# Patient Record
Sex: FEMALE | Race: WHITE | NOT HISPANIC OR LATINO | Employment: FULL TIME | URBAN - METROPOLITAN AREA
[De-identification: names, ages, dates, MRNs, and addresses within clinical notes are randomized per-mention and may not be internally consistent; named-entity substitution may affect disease eponyms.]

---

## 2019-10-09 DIAGNOSIS — Z01.812 PRE-OPERATIVE LABORATORY EXAMINATION: ICD-10-CM

## 2019-10-09 LAB
BASOPHILS # BLD AUTO: 1.5 % (ref 0–1.8)
BASOPHILS # BLD: 0.15 K/UL (ref 0–0.12)
EOSINOPHIL # BLD AUTO: 0.18 K/UL (ref 0–0.51)
EOSINOPHIL NFR BLD: 1.8 % (ref 0–6.9)
ERYTHROCYTE [DISTWIDTH] IN BLOOD BY AUTOMATED COUNT: 41.8 FL (ref 35.9–50)
HCG SERPL QL: NEGATIVE
HCT VFR BLD AUTO: 39.8 % (ref 37–47)
HGB BLD-MCNC: 12.1 G/DL (ref 12–16)
IMM GRANULOCYTES # BLD AUTO: 0.02 K/UL (ref 0–0.11)
IMM GRANULOCYTES NFR BLD AUTO: 0.2 % (ref 0–0.9)
LYMPHOCYTES # BLD AUTO: 3.87 K/UL (ref 1–4.8)
LYMPHOCYTES NFR BLD: 38.1 % (ref 22–41)
MCH RBC QN AUTO: 25.8 PG (ref 27–33)
MCHC RBC AUTO-ENTMCNC: 30.4 G/DL (ref 33.6–35)
MCV RBC AUTO: 84.9 FL (ref 81.4–97.8)
MONOCYTES # BLD AUTO: 0.8 K/UL (ref 0–0.85)
MONOCYTES NFR BLD AUTO: 7.9 % (ref 0–13.4)
NEUTROPHILS # BLD AUTO: 5.15 K/UL (ref 2–7.15)
NEUTROPHILS NFR BLD: 50.5 % (ref 44–72)
NRBC # BLD AUTO: 0 K/UL
NRBC BLD-RTO: 0 /100 WBC
PLATELET # BLD AUTO: 286 K/UL (ref 164–446)
PMV BLD AUTO: 10.1 FL (ref 9–12.9)
RBC # BLD AUTO: 4.69 M/UL (ref 4.2–5.4)
WBC # BLD AUTO: 10.2 K/UL (ref 4.8–10.8)

## 2019-10-09 PROCEDURE — 84703 CHORIONIC GONADOTROPIN ASSAY: CPT

## 2019-10-09 PROCEDURE — 85025 COMPLETE CBC W/AUTO DIFF WBC: CPT

## 2019-10-09 PROCEDURE — 36415 COLL VENOUS BLD VENIPUNCTURE: CPT

## 2019-10-09 RX ORDER — TAMOXIFEN CITRATE 20 MG/1
20 TABLET ORAL EVERY MORNING
COMMUNITY

## 2019-10-09 RX ORDER — OMEPRAZOLE 40 MG/1
40 CAPSULE, DELAYED RELEASE ORAL EVERY MORNING
COMMUNITY

## 2019-10-09 RX ORDER — ASCORBIC ACID 500 MG
TABLET ORAL DAILY
COMMUNITY

## 2019-10-10 ENCOUNTER — ANESTHESIA (OUTPATIENT)
Dept: SURGERY | Facility: MEDICAL CENTER | Age: 44
End: 2019-10-10
Payer: COMMERCIAL

## 2019-10-10 ENCOUNTER — ANESTHESIA EVENT (OUTPATIENT)
Dept: SURGERY | Facility: MEDICAL CENTER | Age: 44
End: 2019-10-10
Payer: COMMERCIAL

## 2019-10-10 ENCOUNTER — HOSPITAL ENCOUNTER (OUTPATIENT)
Facility: MEDICAL CENTER | Age: 44
End: 2019-10-10
Attending: PLASTIC SURGERY | Admitting: PLASTIC SURGERY
Payer: COMMERCIAL

## 2019-10-10 VITALS
WEIGHT: 210.1 LBS | TEMPERATURE: 98.2 F | RESPIRATION RATE: 18 BRPM | DIASTOLIC BLOOD PRESSURE: 52 MMHG | HEIGHT: 65 IN | SYSTOLIC BLOOD PRESSURE: 99 MMHG | HEART RATE: 68 BPM | BODY MASS INDEX: 35 KG/M2 | OXYGEN SATURATION: 98 %

## 2019-10-10 DIAGNOSIS — G89.18 CHEST WALL PAIN FOLLOWING SURGERY: ICD-10-CM

## 2019-10-10 DIAGNOSIS — R07.89 CHEST WALL PAIN FOLLOWING SURGERY: ICD-10-CM

## 2019-10-10 PROCEDURE — 160029 HCHG SURGERY MINUTES - 1ST 30 MINS LEVEL 4: Performed by: PLASTIC SURGERY

## 2019-10-10 PROCEDURE — 500438 HCHG DRESSING, SPANDAGE #10 12: Performed by: PLASTIC SURGERY

## 2019-10-10 PROCEDURE — 160048 HCHG OR STATISTICAL LEVEL 1-5: Performed by: PLASTIC SURGERY

## 2019-10-10 PROCEDURE — 160041 HCHG SURGERY MINUTES - EA ADDL 1 MIN LEVEL 4: Performed by: PLASTIC SURGERY

## 2019-10-10 PROCEDURE — 160036 HCHG PACU - EA ADDL 30 MINS PHASE I: Performed by: PLASTIC SURGERY

## 2019-10-10 PROCEDURE — 700101 HCHG RX REV CODE 250: Performed by: PLASTIC SURGERY

## 2019-10-10 PROCEDURE — 160009 HCHG ANES TIME/MIN: Performed by: PLASTIC SURGERY

## 2019-10-10 PROCEDURE — 700111 HCHG RX REV CODE 636 W/ 250 OVERRIDE (IP): Performed by: ANESTHESIOLOGY

## 2019-10-10 PROCEDURE — 160025 RECOVERY II MINUTES (STATS): Performed by: PLASTIC SURGERY

## 2019-10-10 PROCEDURE — 501445 HCHG STAPLER, SKIN DISP: Performed by: PLASTIC SURGERY

## 2019-10-10 PROCEDURE — 501838 HCHG SUTURE GENERAL: Performed by: PLASTIC SURGERY

## 2019-10-10 PROCEDURE — 700111 HCHG RX REV CODE 636 W/ 250 OVERRIDE (IP): Performed by: PLASTIC SURGERY

## 2019-10-10 PROCEDURE — 160047 HCHG PACU  - EA ADDL 30 MINS PHASE II: Performed by: PLASTIC SURGERY

## 2019-10-10 PROCEDURE — 502000 HCHG MISC OR IMPLANTS RC 0278: Performed by: PLASTIC SURGERY

## 2019-10-10 PROCEDURE — 160046 HCHG PACU - 1ST 60 MINS PHASE II: Performed by: PLASTIC SURGERY

## 2019-10-10 PROCEDURE — A9270 NON-COVERED ITEM OR SERVICE: HCPCS | Performed by: ANESTHESIOLOGY

## 2019-10-10 PROCEDURE — 160002 HCHG RECOVERY MINUTES (STAT): Performed by: PLASTIC SURGERY

## 2019-10-10 PROCEDURE — 700101 HCHG RX REV CODE 250: Performed by: ANESTHESIOLOGY

## 2019-10-10 PROCEDURE — 700105 HCHG RX REV CODE 258: Performed by: PLASTIC SURGERY

## 2019-10-10 PROCEDURE — 700102 HCHG RX REV CODE 250 W/ 637 OVERRIDE(OP): Performed by: ANESTHESIOLOGY

## 2019-10-10 PROCEDURE — 160035 HCHG PACU - 1ST 60 MINS PHASE I: Performed by: PLASTIC SURGERY

## 2019-10-10 DEVICE — IMPLANTABLE DEVICE: Type: IMPLANTABLE DEVICE | Site: BREAST | Status: FUNCTIONAL

## 2019-10-10 RX ORDER — OXYCODONE HCL 5 MG/5 ML
10 SOLUTION, ORAL ORAL
Status: COMPLETED | OUTPATIENT
Start: 2019-10-10 | End: 2019-10-10

## 2019-10-10 RX ORDER — OXYCODONE HCL 5 MG/5 ML
5 SOLUTION, ORAL ORAL
Status: COMPLETED | OUTPATIENT
Start: 2019-10-10 | End: 2019-10-10

## 2019-10-10 RX ORDER — MEPERIDINE HYDROCHLORIDE 25 MG/ML
12.5 INJECTION INTRAMUSCULAR; INTRAVENOUS; SUBCUTANEOUS
Status: DISCONTINUED | OUTPATIENT
Start: 2019-10-10 | End: 2019-10-10 | Stop reason: HOSPADM

## 2019-10-10 RX ORDER — HYDROMORPHONE HYDROCHLORIDE 1 MG/ML
0.4 INJECTION, SOLUTION INTRAMUSCULAR; INTRAVENOUS; SUBCUTANEOUS
Status: DISCONTINUED | OUTPATIENT
Start: 2019-10-10 | End: 2019-10-10 | Stop reason: HOSPADM

## 2019-10-10 RX ORDER — MIDAZOLAM HYDROCHLORIDE 1 MG/ML
INJECTION INTRAMUSCULAR; INTRAVENOUS PRN
Status: DISCONTINUED | OUTPATIENT
Start: 2019-10-10 | End: 2019-10-10 | Stop reason: SURG

## 2019-10-10 RX ORDER — HYDROCODONE BITARTRATE AND ACETAMINOPHEN 5; 325 MG/1; MG/1
1-2 TABLET ORAL EVERY 4 HOURS PRN
Qty: 20 TAB | Refills: 0 | Status: SHIPPED | OUTPATIENT
Start: 2019-10-10 | End: 2019-10-25

## 2019-10-10 RX ORDER — LIDOCAINE HYDROCHLORIDE 10 MG/ML
INJECTION, SOLUTION INFILTRATION; PERINEURAL
Status: DISCONTINUED
Start: 2019-10-10 | End: 2019-10-10 | Stop reason: HOSPADM

## 2019-10-10 RX ORDER — SCOLOPAMINE TRANSDERMAL SYSTEM 1 MG/1
1 PATCH, EXTENDED RELEASE TRANSDERMAL
Status: DISCONTINUED | OUTPATIENT
Start: 2019-10-10 | End: 2019-10-10 | Stop reason: HOSPADM

## 2019-10-10 RX ORDER — DIPHENHYDRAMINE HYDROCHLORIDE 50 MG/ML
6.25 INJECTION INTRAMUSCULAR; INTRAVENOUS
Status: DISCONTINUED | OUTPATIENT
Start: 2019-10-10 | End: 2019-10-10 | Stop reason: HOSPADM

## 2019-10-10 RX ORDER — ONDANSETRON 2 MG/ML
4 INJECTION INTRAMUSCULAR; INTRAVENOUS
Status: DISCONTINUED | OUTPATIENT
Start: 2019-10-10 | End: 2019-10-10 | Stop reason: HOSPADM

## 2019-10-10 RX ORDER — EPINEPHRINE 1 MG/ML(1)
AMPUL (ML) INJECTION
Status: DISCONTINUED
Start: 2019-10-10 | End: 2019-10-10 | Stop reason: HOSPADM

## 2019-10-10 RX ORDER — ONDANSETRON 2 MG/ML
INJECTION INTRAMUSCULAR; INTRAVENOUS PRN
Status: DISCONTINUED | OUTPATIENT
Start: 2019-10-10 | End: 2019-10-10 | Stop reason: HOSPADM

## 2019-10-10 RX ORDER — HYDROMORPHONE HYDROCHLORIDE 1 MG/ML
0.2 INJECTION, SOLUTION INTRAMUSCULAR; INTRAVENOUS; SUBCUTANEOUS
Status: DISCONTINUED | OUTPATIENT
Start: 2019-10-10 | End: 2019-10-10 | Stop reason: HOSPADM

## 2019-10-10 RX ORDER — HYDROMORPHONE HYDROCHLORIDE 2 MG/ML
INJECTION, SOLUTION INTRAMUSCULAR; INTRAVENOUS; SUBCUTANEOUS PRN
Status: DISCONTINUED | OUTPATIENT
Start: 2019-10-10 | End: 2019-10-10 | Stop reason: SURG

## 2019-10-10 RX ORDER — DEXAMETHASONE SODIUM PHOSPHATE 4 MG/ML
INJECTION, SOLUTION INTRA-ARTICULAR; INTRALESIONAL; INTRAMUSCULAR; INTRAVENOUS; SOFT TISSUE PRN
Status: DISCONTINUED | OUTPATIENT
Start: 2019-10-10 | End: 2019-10-10 | Stop reason: SURG

## 2019-10-10 RX ORDER — HALOPERIDOL 5 MG/ML
1 INJECTION INTRAMUSCULAR
Status: DISCONTINUED | OUTPATIENT
Start: 2019-10-10 | End: 2019-10-10 | Stop reason: HOSPADM

## 2019-10-10 RX ORDER — ACETAMINOPHEN 500 MG
1000 TABLET ORAL ONCE
Status: COMPLETED | OUTPATIENT
Start: 2019-10-10 | End: 2019-10-10

## 2019-10-10 RX ORDER — GABAPENTIN 300 MG/1
300 CAPSULE ORAL
Status: COMPLETED | OUTPATIENT
Start: 2019-10-10 | End: 2019-10-10

## 2019-10-10 RX ORDER — HYDROMORPHONE HYDROCHLORIDE 1 MG/ML
0.1 INJECTION, SOLUTION INTRAMUSCULAR; INTRAVENOUS; SUBCUTANEOUS
Status: DISCONTINUED | OUTPATIENT
Start: 2019-10-10 | End: 2019-10-10 | Stop reason: HOSPADM

## 2019-10-10 RX ORDER — SODIUM CHLORIDE, SODIUM LACTATE, POTASSIUM CHLORIDE, CALCIUM CHLORIDE 600; 310; 30; 20 MG/100ML; MG/100ML; MG/100ML; MG/100ML
INJECTION, SOLUTION INTRAVENOUS CONTINUOUS
Status: DISCONTINUED | OUTPATIENT
Start: 2019-10-10 | End: 2019-10-10 | Stop reason: HOSPADM

## 2019-10-10 RX ORDER — BACITRACIN 50000 [IU]/1
INJECTION, POWDER, FOR SOLUTION INTRAMUSCULAR
Status: DISCONTINUED
Start: 2019-10-10 | End: 2019-10-10 | Stop reason: HOSPADM

## 2019-10-10 RX ORDER — BUPIVACAINE HYDROCHLORIDE AND EPINEPHRINE 5; 5 MG/ML; UG/ML
INJECTION, SOLUTION EPIDURAL; INTRACAUDAL; PERINEURAL
Status: DISCONTINUED
Start: 2019-10-10 | End: 2019-10-10 | Stop reason: HOSPADM

## 2019-10-10 RX ORDER — CEFAZOLIN SODIUM 1 G/3ML
INJECTION, POWDER, FOR SOLUTION INTRAMUSCULAR; INTRAVENOUS
Status: DISCONTINUED
Start: 2019-10-10 | End: 2019-10-10 | Stop reason: HOSPADM

## 2019-10-10 RX ORDER — LIDOCAINE HYDROCHLORIDE 10 MG/ML
INJECTION, SOLUTION EPIDURAL; INFILTRATION; INTRACAUDAL; PERINEURAL
Status: DISCONTINUED
Start: 2019-10-10 | End: 2019-10-10 | Stop reason: HOSPADM

## 2019-10-10 RX ORDER — ONDANSETRON 4 MG/1
4 TABLET, FILM COATED ORAL EVERY 4 HOURS PRN
Qty: 10 TAB | Refills: 1 | Status: SHIPPED | OUTPATIENT
Start: 2019-10-10

## 2019-10-10 RX ORDER — GENTAMICIN SULFATE 40 MG/ML
INJECTION, SOLUTION INTRAMUSCULAR; INTRAVENOUS
Status: DISCONTINUED
Start: 2019-10-10 | End: 2019-10-10 | Stop reason: HOSPADM

## 2019-10-10 RX ORDER — LIDOCAINE HYDROCHLORIDE 20 MG/ML
INJECTION, SOLUTION EPIDURAL; INFILTRATION; INTRACAUDAL; PERINEURAL PRN
Status: DISCONTINUED | OUTPATIENT
Start: 2019-10-10 | End: 2019-10-10 | Stop reason: SURG

## 2019-10-10 RX ORDER — CEFAZOLIN SODIUM 1 G/3ML
INJECTION, POWDER, FOR SOLUTION INTRAMUSCULAR; INTRAVENOUS PRN
Status: DISCONTINUED | OUTPATIENT
Start: 2019-10-10 | End: 2019-10-10 | Stop reason: SURG

## 2019-10-10 RX ORDER — CEPHALEXIN 500 MG/1
500 CAPSULE ORAL 4 TIMES DAILY
Qty: 28 CAP | Refills: 0 | Status: SHIPPED | OUTPATIENT
Start: 2019-10-10

## 2019-10-10 RX ORDER — HYDRALAZINE HYDROCHLORIDE 20 MG/ML
5 INJECTION INTRAMUSCULAR; INTRAVENOUS
Status: DISCONTINUED | OUTPATIENT
Start: 2019-10-10 | End: 2019-10-10 | Stop reason: HOSPADM

## 2019-10-10 RX ADMIN — DEXAMETHASONE SODIUM PHOSPHATE 8 MG: 4 INJECTION, SOLUTION INTRA-ARTICULAR; INTRALESIONAL; INTRAMUSCULAR; INTRAVENOUS; SOFT TISSUE at 09:58

## 2019-10-10 RX ADMIN — ACETAMINOPHEN 1000 MG: 500 TABLET ORAL at 09:13

## 2019-10-10 RX ADMIN — FENTANYL CITRATE 50 MCG: 50 INJECTION, SOLUTION INTRAMUSCULAR; INTRAVENOUS at 09:54

## 2019-10-10 RX ADMIN — GABAPENTIN 300 MG: 300 CAPSULE ORAL at 09:13

## 2019-10-10 RX ADMIN — ONDANSETRON 4 MG: 2 INJECTION INTRAMUSCULAR; INTRAVENOUS at 10:52

## 2019-10-10 RX ADMIN — EPHEDRINE SULFATE 10 MG: 50 INJECTION, SOLUTION INTRAVENOUS at 10:21

## 2019-10-10 RX ADMIN — FENTANYL CITRATE 50 MCG: 50 INJECTION, SOLUTION INTRAMUSCULAR; INTRAVENOUS at 10:08

## 2019-10-10 RX ADMIN — LIDOCAINE HYDROCHLORIDE 60 MG: 20 INJECTION, SOLUTION EPIDURAL; INFILTRATION; INTRACAUDAL at 09:54

## 2019-10-10 RX ADMIN — SODIUM CHLORIDE, POTASSIUM CHLORIDE, SODIUM LACTATE AND CALCIUM CHLORIDE: 600; 310; 30; 20 INJECTION, SOLUTION INTRAVENOUS at 09:12

## 2019-10-10 RX ADMIN — OXYCODONE HYDROCHLORIDE 10 MG: 5 SOLUTION ORAL at 11:54

## 2019-10-10 RX ADMIN — FENTANYL CITRATE 50 MCG: 50 INJECTION INTRAMUSCULAR; INTRAVENOUS at 13:01

## 2019-10-10 RX ADMIN — PROPOFOL 75 MCG/KG/MIN: 10 INJECTION, EMULSION INTRAVENOUS at 09:55

## 2019-10-10 RX ADMIN — FENTANYL CITRATE 50 MCG: 50 INJECTION, SOLUTION INTRAMUSCULAR; INTRAVENOUS at 10:27

## 2019-10-10 RX ADMIN — PROPOFOL 200 MG: 10 INJECTION, EMULSION INTRAVENOUS at 09:54

## 2019-10-10 RX ADMIN — CEFAZOLIN 2 G: 330 INJECTION, POWDER, FOR SOLUTION INTRAMUSCULAR; INTRAVENOUS at 09:54

## 2019-10-10 RX ADMIN — FENTANYL CITRATE 50 MCG: 50 INJECTION INTRAMUSCULAR; INTRAVENOUS at 12:47

## 2019-10-10 RX ADMIN — MIDAZOLAM 2 MG: 1 INJECTION INTRAMUSCULAR; INTRAVENOUS at 09:51

## 2019-10-10 RX ADMIN — FENTANYL CITRATE 50 MCG: 50 INJECTION, SOLUTION INTRAMUSCULAR; INTRAVENOUS at 10:36

## 2019-10-10 RX ADMIN — HYDROMORPHONE HYDROCHLORIDE 0.5 MG: 2 INJECTION, SOLUTION INTRAMUSCULAR; INTRAVENOUS; SUBCUTANEOUS at 10:49

## 2019-10-10 ASSESSMENT — PAIN SCALES - GENERAL: PAIN_LEVEL: 4

## 2019-10-10 NOTE — ANESTHESIA PROCEDURE NOTES
Airway  Date/Time: 10/10/2019 9:54 AM  Performed by: Reuben Rodriguez M.D.  Authorized by: Reuben Rodriguez M.D.     Location:  OR  Urgency:  Elective  Difficult Airway: No    Indications for Airway Management:  Anesthesia  Spontaneous Ventilation: absent    Sedation Level:  Deep  Preoxygenated: Yes    Mask Difficulty Assessment:  1 - vent by mask  Final Airway Type:  Supraglottic airway  Final Supraglottic Airway:  Standard LMA  SGA Size:  3  Number of Attempts at Approach:  1

## 2019-10-10 NOTE — ANESTHESIA PREPROCEDURE EVALUATION
Past Medical History:   Diagnosis Date   • Anemia    • Anesthesia     post op n/v   • Anxiety    • Arthritis     spondylosis arthritis in back   • Bowel habit changes     constipation   • Cancer (HCC) 2019    right breast   • GERD (gastroesophageal reflux disease)    • History of chronic cough    • Hypertension    • Pain 10/09/2019    chronic back, 1-2/10   • Snoring     no sleep study         Relevant Problems   No relevant active problems       Physical Exam    Airway   Mallampati: II  TM distance: >3 FB  Neck ROM: full       Cardiovascular - normal exam  Rhythm: regular  Rate: normal  (-) murmur     Dental - normal exam         Pulmonary - normal exam  Breath sounds clear to auscultation     Abdominal    Neurological - normal exam                 Anesthesia Plan    ASA 2       Plan - general       Airway plan will be LMA        Induction: intravenous    Postoperative Plan: Postoperative administration of opioids is intended.    Pertinent diagnostic labs and testing reviewed    Informed Consent:    Anesthetic plan and risks discussed with patient.

## 2019-10-10 NOTE — ANESTHESIA POSTPROCEDURE EVALUATION
Patient: Brenna Zhong    Procedure Summary     Date:  10/10/19 Room / Location:  [facility] ROOM 28 / SURGERY SAME DAY [facility]    Anesthesia Start:  0951 Anesthesia Stop:  1107    Procedures:       RECONSTRUCTION, BREAST-REVISION WITH DERMAL GLANDULAR FLAPS AND FAT GRAFTING (Bilateral Breast)      TISSUE EXPANDER- FOR REMOVAL (Bilateral Breast)      INSERTION, IMPLANT, BREAST (Bilateral Breast)      CAPSULOTOMY- FOR PARTIAL CAPSULECTOMY (Bilateral Breast) Diagnosis:  (MALIGNANT NEOPLASM OF OVERLAPPING SITES OF RIGHT BREAST)    Surgeon:  Leonard Alva M.D. Responsible Provider:  Reuben Rodriguez M.D.    Anesthesia Type:  general ASA Status:  2          Final Anesthesia Type: general  Last vitals  BP   Blood Pressure: 112/62    Temp   36.8 °C (98.2 °F)    Pulse   Pulse: (!) 110   Resp   18    SpO2   100 %      Anesthesia Post Evaluation    Patient location during evaluation: PACU  Patient participation: complete - patient participated  Level of consciousness: sleepy but conscious  Pain score: 4    Airway patency: patent  Anesthetic complications: no  Cardiovascular status: hemodynamically stable  Respiratory status: acceptable  Hydration status: euvolemic    PONV: none

## 2019-10-10 NOTE — OR NURSING
1111- assumed care of pt she is resting comfortable at this time . States pain is minimal at this time doesn't want to take pain medicine yet   1154- states pain level is starting to increase medicated with oral pain meds   1220- spouse to bedside   1247 po pain meds not helping yet medicated iv fent see mar  1327 pain currently at 2/10 without nausea qualifies for stage 2 transferred   1420 up to bathroom able to void dressed in bathroom and placed into a recliner chair   1500 states ready to dc to home meets all dc criteria iv removed escorted out to car with all belongings accomp with spouse

## 2019-10-10 NOTE — OP REPORT
DATE OF SERVICE:  10/10/2019    PREOPERATIVE DIAGNOSIS:  History of breast cancer.    POSTOPERATIVE DIAGNOSIS:  History of breast cancer.    PROCEDURES:  1.  Bilateral tissue expander removal and gel implant placement.  2.  Bilateral inferiorly based dermal glandular flaps for elevation and   reconstruction of inframammary fold with revision of breast reconstruction.  3.  Bilateral breast reconstruction with fat grafting.    ATTENDING SURGEON:  Leonard Alva MD    ANESTHESIOLOGIST:  Reuben Rodriguez MD    ASSISTANT:  CYNDEE Jameson    ANESTHESIA TYPE:  General.    SPECIMENS:  None.    ESTIMATED BLOOD LOSS:  Minimal.    COMPLICATIONS:  Non-apparent.    SPECIMENS:  None.    INDICATIONS FOR PROCEDURE:  The patient is a 43-year-old woman who underwent   bilateral mastectomies and reconstruction with placement of tissue expanders.    The patient is obese and has had completed expansion.  She now presents for   the above operation.    INTRAOPERATIVE FINDINGS:  There was approximately 80 mL of fat grafted in each   of the reconstructed breast.  The implants that were used were Allergan style   SCF Natrelle Inspira cohesive breast implant 770 mL, reference number   [id number].  Same size implant on both sides.    DESCRIPTION OF PROCEDURE:  After the operative and nonoperative options were   discussed and include was not limited to bleeding, infection, damage to   surrounding structures, need for further surgery, reaction to anesthetic   agent, scarring, breast asymmetry, contour irregularity, wound healing   difficulties, need for revisional surgery, implant failure, implant rupture,   capsular contracture development, wound healing difficulties, deep venous   thrombosis, pulmonary embolus, myocardial infarction, stroke, unsatisfactory   result and/or death, informed consent was obtained.    Patient was identified.  In a sitting upright position, the patient's sternal   notch midline inframammary folds were marked.   The planned dermal glandular   flaps were marked along the inframammary fold.  Areas for fat graft harvest   were marked in the infraumbilical abdomen.  Antibiotics given, sequential   compression devices placed.  Patient was brought to the OR where general   anesthesia was induced.  Her chest and abdomen were then prepped and draped in   the usual sterile fashion.  Starting on the right hand side, an incision was   made through the old mastectomy scar.  Cautery was used to dissect down to the   underlying tissue down to the underlying capsule, which was opened up.    Tissue expander was then deflated and removed.  At this point, capsulotomies   were performed medially and superiorly in order to open up the pocket.    Capsulorrhaphies were performed laterally with horizontal mattress 2-0 Vicryl   sutures.  The pocket was then copiously irrigated with triple antibiotic   irrigation.  The patient was then sat up just slightly.  Given her obese   abdomen, this was fairly limited.  I felt that the 800 mL implant would be   most appropriate.  With the sizer in position, the dermal glandular flaps were   then incised with a #10 blade along the inframammary fold.  Cautery was used   to elevate and mobilize and create advancement flaps.  A portion of the tissue   was then deepithelialized with a #10 blade with Bovie electrocautery.  The   flaps were then elevated in a superior to inferior direction.  This was done   to create a crisp well defined in the inframammary fold.  The standing   cutaneous deformities were excised medially and laterally.  Following this   then, the sizer was removed.  The pocket was then copiously irrigated with   triple antibiotic irrigation and Betadine.  Then, using new gloves and minimal   touch technique, the implant was placed.  The deep tissue was then closed   with horizontal mattress 2-0 Vicryl sutures.  All cutaneous incisions were   then closed with 3-0 deep dermal Monocryl sutures and a  running 4-0 Monocryl   subcuticular stitch to close the overlying skin.    We then turned our attention to the left side where the same procedure was   performed and the same size implant 800 mL was placed.    The poke incisions were made in the bilateral lower quadrant.  Tumescent   solution was then placed in these locations.  Adequate time was allowed for   the hemostatic effects of epinephrine to take effect.  Fat was then harvested   from the supra and infraumbilical abdomen using [employer] fat harvesting system.    Fat was then irrigated 3 times per protocol and then loaded in 20 mL   syringes.  Poke incisions were made along the superior medial aspect of the   breast.  Fat was then sequentially grafted along the superior medial and   superior lateral poles and along the medial aspect of the breast.  A fanning   technique was used.  Care was taken to ensure equal amounts of fat were placed   with each passage of the cannula.  The fat was then further molded into   position.  The poke incisions both on the breast and abdomen were then closed   with interrupted 5-0 fast absorbing sutures.  The patient was then washed.    Steri-Strips and compressive dressing was then placed.  The patient was then   awakened, extubated, and transferred to the PACU in stable condition.  At the   end of procedure, all sponge, instrument and needle counts were correct.       ____________________________________     MD KELLIE BUSH / CASSANDRA    DD:  10/10/2019 11:04:40  DT:  10/10/2019 13:42:24    D#:  [id number]  Job#:  [id number]

## 2019-10-10 NOTE — DISCHARGE INSTRUCTIONS
ACTIVITY: Rest and take it easy for the first 24 hours.  A responsible adult is recommended to remain with you during that time.  It is normal to feel sleepy.  We encourage you to not do anything that requires balance, judgment or coordination.    MILD FLU-LIKE SYMPTOMS ARE NORMAL. YOU MAY EXPERIENCE GENERALIZED MUSCLE ACHES, THROAT IRRITATION, HEADACHE AND/OR SOME NAUSEA.    FOR 24 HOURS DO NOT:  Drive, operate machinery or run household appliances.  Drink beer or alcoholic beverages.   Make important decisions or sign legal documents.    SPECIAL INSTRUCTIONS: *Keep dressing dry and clean for 3 days then okay to shower. Cool compresses as needed.  **    DIET: To avoid nausea, slowly advance diet as tolerated, avoiding spicy or greasy foods for the first day.  Add more substantial food to your diet according to your physician's instructions.  Babies can be fed formula or breast milk as soon as they are hungry.  INCREASE FLUIDS AND FIBER TO AVOID CONSTIPATION.    SURGICAL DRESSING/BATHING: *see above**    FOLLOW-UP APPOINTMENT:  A follow-up appointment should be arranged with your doctor  call to schedule Follow up as already scheduled     You should CALL YOUR PHYSICIAN if you develop:  Fever greater than 101 degrees F.  Pain not relieved by medication, or persistent nausea or vomiting.  Excessive bleeding (blood soaking through dressing) or unexpected drainage from the wound.  Extreme redness or swelling around the incision site, drainage of pus or foul smelling drainage.  Inability to urinate or empty your bladder within 8 hours.  Problems with breathing or chest pain.    You should call 911 if you develop problems with breathing or chest pain.  If you are unable to contact your doctor or surgical center, you should go to the nearest emergency room or urgent care center.  Physician's telephone #: *[phone] **    If any questions arise, call your doctor.  If your doctor is not available, please feel free to  call the Surgical Center at [phone].  The Center is open Monday through Friday from 7AM to 7PM.  You can also call the HEALTH HOTLINE open 24 hours/day, 7 days/week and speak to a nurse at [phone], or toll free at [phone].    A registered nurse may call you a few days after your surgery to see how you are doing after your procedure.    MEDICATIONS: Resume taking daily medication.  Take prescribed pain medication with food.  If no medication is prescribed, you may take non-aspirin pain medication if needed.  PAIN MEDICATION CAN BE VERY CONSTIPATING.  Take a stool softener or laxative such as senokot, pericolace, or milk of magnesia if needed.    Prescription given for *norco antibiotic and nausea medicine **.  Last pain medication given at *1200pm**.    If your physician has prescribed pain medication that includes Acetaminophen (Tylenol), do not take additional Acetaminophen (Tylenol) while taking the prescribed medication.    Depression / Suicide Risk    As you are discharged from this [facility] facility, it is important to learn how to keep safe from harming yourself.    Recognize the warning signs:  · Abrupt changes in personality, positive or negative- including increase in energy   · Giving away possessions  · Change in eating patterns- significant weight changes-  positive or negative  · Change in sleeping patterns- unable to sleep or sleeping all the time   · Unwillingness or inability to communicate  · Depression  · Unusual sadness, discouragement and loneliness  · Talk of wanting to die  · Neglect of personal appearance   · Rebelliousness- reckless behavior  · Withdrawal from people/activities they love  · Confusion- inability to concentrate     If you or a loved one observes any of these behaviors or has concerns about self-harm, here's what you can do:  · Talk about it- your feelings and reasons for harming yourself  · Remove any means that you might use to hurt yourself (examples:  pills, rope, extension cords, firearm)  · Get professional help from the community (Mental Health, Substance Abuse, psychological counseling)  · Do not be alone:Call your Safe Contact- someone whom you trust who will be there for you.  · Call your local CRISIS HOTLINE 213-3158 or 082-846-2712  · Call your local Children's Mobile Crisis Response Team [place] (149) 282-4974 or www.[employer]  · Call the toll free National Suicide Prevention Hotlines   · National Suicide Prevention Lifeline 170-126-MYHZ (3293)  · National Hope Line Network 800-SUICIDE (328-4926)

## 2019-10-10 NOTE — ANESTHESIA TIME REPORT
Anesthesia Start and Stop Event Times     Date Time Event    10/10/2019 0938 Ready for Procedure     0951 Anesthesia Start     1107 Anesthesia Stop        Responsible Staff  10/10/19    Name Role Begin End    Reuben Rodriguez M.D. Anesth 0951 1107        Preop Diagnosis (Free Text):  Pre-op Diagnosis     MALIGNANT NEOPLASM OF OVERLAPPING SITES OF RIGHT BREAST        Preop Diagnosis (Codes):    Post op Diagnosis  History of right breast cancer      Premium Reason  Non-Premium    Comments:

## 2019-10-10 NOTE — OR NURSING
1107 Pt arrived to PACU from OR. Report received from Dr. Rodriguez and Amy HERNANDEZ. Pt on 2L of O2.     1111 Report given to Letty HERNANDEZ.

## 2019-10-10 NOTE — OR SURGEON
Immediate Post OP Note    PreOp Diagnosis: history of breast cancer    PostOp Diagnosis: same    Procedure(s):  RECONSTRUCTION, BREAST-REVISION WITH DERMAL GLANDULAR FLAPS AND FAT GRAFTING  REMOVAL OR INSERTION, TISSUE EXPANDER- FOR REMOVAL  INSERTION, IMPLANT, BREAST  CAPSULOTOMY- FOR PARTIAL CAPSULECTOMY    Surgeon(s):  Leonard Alva M.D.    Anesthesiologist/Type of Anesthesia:  Anesthesiologist: Reuben Rodriguez M.D./General    Surgical Staff:  Assistant: Carly Ventura  Circulator: Amy Albrecht R.N.  Scrub Person: Aliya Storm    Specimens removed if any:  * No specimens in log *    Estimated Blood Loss: minimal    Findings: see operative note    Complications: no apparent    #[id number]        10/10/2019 9:56 AM Leonard Alva M.D.

## 2019-12-26 ENCOUNTER — ANESTHESIA EVENT (OUTPATIENT)
Dept: SURGERY | Facility: MEDICAL CENTER | Age: 44
End: 2019-12-26
Payer: COMMERCIAL

## 2019-12-26 ENCOUNTER — ANESTHESIA (OUTPATIENT)
Dept: SURGERY | Facility: MEDICAL CENTER | Age: 44
End: 2019-12-26
Payer: COMMERCIAL

## 2019-12-26 ENCOUNTER — HOSPITAL ENCOUNTER (OUTPATIENT)
Facility: MEDICAL CENTER | Age: 44
End: 2019-12-26
Attending: PLASTIC SURGERY | Admitting: PLASTIC SURGERY
Payer: COMMERCIAL

## 2019-12-26 VITALS
HEART RATE: 60 BPM | BODY MASS INDEX: 36.32 KG/M2 | TEMPERATURE: 97 F | SYSTOLIC BLOOD PRESSURE: 90 MMHG | OXYGEN SATURATION: 95 % | RESPIRATION RATE: 16 BRPM | HEIGHT: 64 IN | DIASTOLIC BLOOD PRESSURE: 58 MMHG | WEIGHT: 212.74 LBS

## 2019-12-26 PROBLEM — K21.9 GASTROESOPHAGEAL REFLUX DISEASE: Status: ACTIVE | Noted: 2019-12-26

## 2019-12-26 PROBLEM — E66.9 OBESITY (BMI 30-39.9): Status: ACTIVE | Noted: 2019-12-26

## 2019-12-26 PROBLEM — R11.2 PONV (POSTOPERATIVE NAUSEA AND VOMITING): Status: ACTIVE | Noted: 2019-12-26

## 2019-12-26 PROBLEM — Z98.890 PONV (POSTOPERATIVE NAUSEA AND VOMITING): Status: ACTIVE | Noted: 2019-12-26

## 2019-12-26 PROCEDURE — A6223 GAUZE >16<=48 NO W/SAL W/O B: HCPCS | Performed by: PLASTIC SURGERY

## 2019-12-26 PROCEDURE — 700105 HCHG RX REV CODE 258: Performed by: PLASTIC SURGERY

## 2019-12-26 PROCEDURE — 160048 HCHG OR STATISTICAL LEVEL 1-5: Performed by: PLASTIC SURGERY

## 2019-12-26 PROCEDURE — 160009 HCHG ANES TIME/MIN: Performed by: PLASTIC SURGERY

## 2019-12-26 PROCEDURE — 160002 HCHG RECOVERY MINUTES (STAT): Performed by: PLASTIC SURGERY

## 2019-12-26 PROCEDURE — 160046 HCHG PACU - 1ST 60 MINS PHASE II: Performed by: PLASTIC SURGERY

## 2019-12-26 PROCEDURE — 700111 HCHG RX REV CODE 636 W/ 250 OVERRIDE (IP): Performed by: ANESTHESIOLOGY

## 2019-12-26 PROCEDURE — 700101 HCHG RX REV CODE 250: Performed by: PLASTIC SURGERY

## 2019-12-26 PROCEDURE — A6403 STERILE GAUZE>16 <= 48 SQ IN: HCPCS | Performed by: PLASTIC SURGERY

## 2019-12-26 PROCEDURE — 700102 HCHG RX REV CODE 250 W/ 637 OVERRIDE(OP): Performed by: ANESTHESIOLOGY

## 2019-12-26 PROCEDURE — 160047 HCHG PACU  - EA ADDL 30 MINS PHASE II: Performed by: PLASTIC SURGERY

## 2019-12-26 PROCEDURE — 501838 HCHG SUTURE GENERAL: Performed by: PLASTIC SURGERY

## 2019-12-26 PROCEDURE — 700101 HCHG RX REV CODE 250: Performed by: ANESTHESIOLOGY

## 2019-12-26 PROCEDURE — 160036 HCHG PACU - EA ADDL 30 MINS PHASE I: Performed by: PLASTIC SURGERY

## 2019-12-26 PROCEDURE — 160025 RECOVERY II MINUTES (STATS): Performed by: PLASTIC SURGERY

## 2019-12-26 PROCEDURE — 160029 HCHG SURGERY MINUTES - 1ST 30 MINS LEVEL 4: Performed by: PLASTIC SURGERY

## 2019-12-26 PROCEDURE — 500438 HCHG DRESSING, SPANDAGE #10 12: Performed by: PLASTIC SURGERY

## 2019-12-26 PROCEDURE — 160035 HCHG PACU - 1ST 60 MINS PHASE I: Performed by: PLASTIC SURGERY

## 2019-12-26 PROCEDURE — A9270 NON-COVERED ITEM OR SERVICE: HCPCS | Performed by: ANESTHESIOLOGY

## 2019-12-26 PROCEDURE — 160041 HCHG SURGERY MINUTES - EA ADDL 1 MIN LEVEL 4: Performed by: PLASTIC SURGERY

## 2019-12-26 PROCEDURE — 700111 HCHG RX REV CODE 636 W/ 250 OVERRIDE (IP): Performed by: PLASTIC SURGERY

## 2019-12-26 RX ORDER — OXYCODONE HCL 5 MG/5 ML
10 SOLUTION, ORAL ORAL
Status: DISCONTINUED | OUTPATIENT
Start: 2019-12-26 | End: 2019-12-26 | Stop reason: HOSPADM

## 2019-12-26 RX ORDER — LIDOCAINE HYDROCHLORIDE 10 MG/ML
INJECTION, SOLUTION INFILTRATION; PERINEURAL
Status: DISCONTINUED
Start: 2019-12-26 | End: 2019-12-26 | Stop reason: HOSPADM

## 2019-12-26 RX ORDER — GABAPENTIN 300 MG/1
300 CAPSULE ORAL ONCE
Status: COMPLETED | OUTPATIENT
Start: 2019-12-26 | End: 2019-12-26

## 2019-12-26 RX ORDER — SCOLOPAMINE TRANSDERMAL SYSTEM 1 MG/1
1 PATCH, EXTENDED RELEASE TRANSDERMAL
Status: DISCONTINUED | OUTPATIENT
Start: 2019-12-26 | End: 2019-12-26 | Stop reason: HOSPADM

## 2019-12-26 RX ORDER — ONDANSETRON 2 MG/ML
4 INJECTION INTRAMUSCULAR; INTRAVENOUS
Status: COMPLETED | OUTPATIENT
Start: 2019-12-26 | End: 2019-12-26

## 2019-12-26 RX ORDER — SODIUM CHLORIDE, SODIUM LACTATE, POTASSIUM CHLORIDE, CALCIUM CHLORIDE 600; 310; 30; 20 MG/100ML; MG/100ML; MG/100ML; MG/100ML
INJECTION, SOLUTION INTRAVENOUS CONTINUOUS
Status: DISCONTINUED | OUTPATIENT
Start: 2019-12-26 | End: 2019-12-26 | Stop reason: HOSPADM

## 2019-12-26 RX ORDER — CEFAZOLIN SODIUM 1 G/3ML
INJECTION, POWDER, FOR SOLUTION INTRAMUSCULAR; INTRAVENOUS PRN
Status: DISCONTINUED | OUTPATIENT
Start: 2019-12-26 | End: 2019-12-26 | Stop reason: SURG

## 2019-12-26 RX ORDER — DEXAMETHASONE SODIUM PHOSPHATE 4 MG/ML
INJECTION, SOLUTION INTRA-ARTICULAR; INTRALESIONAL; INTRAMUSCULAR; INTRAVENOUS; SOFT TISSUE PRN
Status: DISCONTINUED | OUTPATIENT
Start: 2019-12-26 | End: 2019-12-26 | Stop reason: SURG

## 2019-12-26 RX ORDER — MEPERIDINE HYDROCHLORIDE 25 MG/ML
12.5 INJECTION INTRAMUSCULAR; INTRAVENOUS; SUBCUTANEOUS
Status: DISCONTINUED | OUTPATIENT
Start: 2019-12-26 | End: 2019-12-26 | Stop reason: HOSPADM

## 2019-12-26 RX ORDER — CHOLECALCIFEROL (VITAMIN D3) 125 MCG
500 CAPSULE ORAL DAILY
COMMUNITY

## 2019-12-26 RX ORDER — LIDOCAINE HYDROCHLORIDE 10 MG/ML
INJECTION, SOLUTION EPIDURAL; INFILTRATION; INTRACAUDAL; PERINEURAL
Status: DISCONTINUED
Start: 2019-12-26 | End: 2019-12-26 | Stop reason: HOSPADM

## 2019-12-26 RX ORDER — BUPIVACAINE HYDROCHLORIDE AND EPINEPHRINE 5; 5 MG/ML; UG/ML
INJECTION, SOLUTION EPIDURAL; INTRACAUDAL; PERINEURAL
Status: DISCONTINUED
Start: 2019-12-26 | End: 2019-12-26 | Stop reason: HOSPADM

## 2019-12-26 RX ORDER — ONDANSETRON 2 MG/ML
INJECTION INTRAMUSCULAR; INTRAVENOUS PRN
Status: DISCONTINUED | OUTPATIENT
Start: 2019-12-26 | End: 2019-12-26 | Stop reason: SURG

## 2019-12-26 RX ORDER — ACETAMINOPHEN 500 MG
1000 TABLET ORAL ONCE
Status: COMPLETED | OUTPATIENT
Start: 2019-12-26 | End: 2019-12-26

## 2019-12-26 RX ORDER — EPINEPHRINE 1 MG/ML(1)
AMPUL (ML) INJECTION
Status: DISCONTINUED
Start: 2019-12-26 | End: 2019-12-26 | Stop reason: HOSPADM

## 2019-12-26 RX ORDER — OXYCODONE HCL 5 MG/5 ML
5 SOLUTION, ORAL ORAL
Status: DISCONTINUED | OUTPATIENT
Start: 2019-12-26 | End: 2019-12-26 | Stop reason: HOSPADM

## 2019-12-26 RX ORDER — HALOPERIDOL 5 MG/ML
1 INJECTION INTRAMUSCULAR
Status: DISCONTINUED | OUTPATIENT
Start: 2019-12-26 | End: 2019-12-26 | Stop reason: HOSPADM

## 2019-12-26 RX ORDER — CELECOXIB 200 MG/1
400 CAPSULE ORAL ONCE
Status: COMPLETED | OUTPATIENT
Start: 2019-12-26 | End: 2019-12-26

## 2019-12-26 RX ADMIN — ONDANSETRON 4 MG: 2 INJECTION INTRAMUSCULAR; INTRAVENOUS at 10:06

## 2019-12-26 RX ADMIN — FENTANYL CITRATE 25 MCG: 0.05 INJECTION, SOLUTION INTRAMUSCULAR; INTRAVENOUS at 12:45

## 2019-12-26 RX ADMIN — CELECOXIB 400 MG: 200 CAPSULE ORAL at 07:05

## 2019-12-26 RX ADMIN — HALOPERIDOL LACTATE 1 MG: 5 INJECTION, SOLUTION INTRAMUSCULAR at 10:30

## 2019-12-26 RX ADMIN — PROPOFOL 160 MG: 10 INJECTION, EMULSION INTRAVENOUS at 07:38

## 2019-12-26 RX ADMIN — FENTANYL CITRATE 25 MCG: 50 INJECTION, SOLUTION INTRAMUSCULAR; INTRAVENOUS at 09:18

## 2019-12-26 RX ADMIN — SODIUM CHLORIDE, POTASSIUM CHLORIDE, SODIUM LACTATE AND CALCIUM CHLORIDE: 600; 310; 30; 20 INJECTION, SOLUTION INTRAVENOUS at 07:04

## 2019-12-26 RX ADMIN — MIDAZOLAM HYDROCHLORIDE 2 MG: 1 INJECTION, SOLUTION INTRAMUSCULAR; INTRAVENOUS at 07:33

## 2019-12-26 RX ADMIN — FENTANYL CITRATE 25 MCG: 50 INJECTION, SOLUTION INTRAMUSCULAR; INTRAVENOUS at 09:11

## 2019-12-26 RX ADMIN — FENTANYL CITRATE 25 MCG: 50 INJECTION, SOLUTION INTRAMUSCULAR; INTRAVENOUS at 07:59

## 2019-12-26 RX ADMIN — ONDANSETRON 4 MG: 2 INJECTION INTRAMUSCULAR; INTRAVENOUS at 09:11

## 2019-12-26 RX ADMIN — FENTANYL CITRATE 25 MCG: 50 INJECTION, SOLUTION INTRAMUSCULAR; INTRAVENOUS at 08:44

## 2019-12-26 RX ADMIN — FENTANYL CITRATE 25 MCG: 50 INJECTION, SOLUTION INTRAMUSCULAR; INTRAVENOUS at 08:56

## 2019-12-26 RX ADMIN — FENTANYL CITRATE 25 MCG: 50 INJECTION, SOLUTION INTRAMUSCULAR; INTRAVENOUS at 08:09

## 2019-12-26 RX ADMIN — FENTANYL CITRATE 50 MCG: 50 INJECTION, SOLUTION INTRAMUSCULAR; INTRAVENOUS at 09:44

## 2019-12-26 RX ADMIN — DEXAMETHASONE SODIUM PHOSPHATE 8 MG: 4 INJECTION, SOLUTION INTRA-ARTICULAR; INTRALESIONAL; INTRAMUSCULAR; INTRAVENOUS; SOFT TISSUE at 07:57

## 2019-12-26 RX ADMIN — FENTANYL CITRATE 25 MCG: 0.05 INJECTION, SOLUTION INTRAMUSCULAR; INTRAVENOUS at 09:34

## 2019-12-26 RX ADMIN — CEFAZOLIN 2 G: 330 INJECTION, POWDER, FOR SOLUTION INTRAMUSCULAR; INTRAVENOUS at 07:38

## 2019-12-26 RX ADMIN — LIDOCAINE HYDROCHLORIDE 100 MG: 20 INJECTION, SOLUTION INTRAVENOUS at 07:38

## 2019-12-26 RX ADMIN — FENTANYL CITRATE 25 MCG: 50 INJECTION, SOLUTION INTRAMUSCULAR; INTRAVENOUS at 07:50

## 2019-12-26 RX ADMIN — FENTANYL CITRATE 50 MCG: 50 INJECTION, SOLUTION INTRAMUSCULAR; INTRAVENOUS at 07:38

## 2019-12-26 RX ADMIN — SCOPALAMINE 1 PATCH: 1 PATCH, EXTENDED RELEASE TRANSDERMAL at 07:05

## 2019-12-26 RX ADMIN — ACETAMINOPHEN 1000 MG: 500 TABLET ORAL at 07:05

## 2019-12-26 RX ADMIN — GABAPENTIN 300 MG: 300 CAPSULE ORAL at 07:05

## 2019-12-26 RX ADMIN — FENTANYL CITRATE 25 MCG: 50 INJECTION, SOLUTION INTRAMUSCULAR; INTRAVENOUS at 07:56

## 2019-12-26 ASSESSMENT — PAIN SCALES - GENERAL: PAIN_LEVEL: 5

## 2019-12-26 NOTE — DISCHARGE INSTRUCTIONS
ACTIVITY: Rest and take it easy for the first 24 hours.  A responsible adult is recommended to remain with you during that time.  It is normal to feel sleepy.  We encourage you to not do anything that requires balance, judgment or coordination.    MILD FLU-LIKE SYMPTOMS ARE NORMAL. YOU MAY EXPERIENCE GENERALIZED MUSCLE ACHES, THROAT IRRITATION, HEADACHE AND/OR SOME NAUSEA.    FOR 24 HOURS DO NOT:  Drive, operate machinery or run household appliances.  Drink beer or alcoholic beverages.   Make important decisions or sign legal documents.    SPECIAL INSTRUCTIONS: keep head elevated. Do not remove dressings. Refrain from heavy lifiting/heavy exercises.     DIET: To avoid nausea, slowly advance diet as tolerated, avoiding spicy or greasy foods for the first day.  Add more substantial food to your diet according to your physician's instructions.  Babies can be fed formula or breast milk as soon as they are hungry.  INCREASE FLUIDS AND FIBER TO AVOID CONSTIPATION.    SURGICAL DRESSING/BATHING: keep dressings clean and dry. Keep steri-strips in place until they fall off.     FOLLOW-UP APPOINTMENT:  A follow-up appointment should be arranged with your doctor ; call to schedule.    You should CALL YOUR PHYSICIAN if you develop:  Fever greater than 101 degrees F.  Pain not relieved by medication, or persistent nausea or vomiting.  Excessive bleeding (blood soaking through dressing) or unexpected drainage from the wound.  Extreme redness or swelling around the incision site, drainage of pus or foul smelling drainage.  Inability to urinate or empty your bladder within 8 hours.  Problems with breathing or chest pain.    You should call 911 if you develop problems with breathing or chest pain.  If you are unable to contact your doctor or surgical center, you should go to the nearest emergency room or urgent care center.  Physician's telephone #: DR. Alva [phone]    If any questions arise, call your doctor.  If your  doctor is not available, please feel free to call the Surgical Center at [phone].  The Center is open Monday through Friday from 7AM to 7PM.  You can also call the HEALTH HOTLINE open 24 hours/day, 7 days/week and speak to a nurse at [phone], or toll free at [phone].    A registered nurse may call you a few days after your surgery to see how you are doing after your procedure.    MEDICATIONS: Resume taking daily medication.  Take prescribed pain medication with food.  If no medication is prescribed, you may take non-aspirin pain medication if needed.  PAIN MEDICATION CAN BE VERY CONSTIPATING.  Take a stool softener or laxative such as senokot, pericolace, or milk of magnesia if needed.    Prescription given for given preop.  Last pain medication given: tylenol at 07:05 am.    If your physician has prescribed pain medication that includes Acetaminophen (Tylenol), do not take additional Acetaminophen (Tylenol) while taking the prescribed medication.    Depression / Suicide Risk    As you are discharged from [facility], it is important to learn how to keep safe from harming yourself.    Recognize the warning signs:  · Abrupt changes in personality, positive or negative- including increase in energy   · Giving away possessions  · Change in eating patterns- significant weight changes-  positive or negative  · Change in sleeping patterns- unable to sleep or sleeping all the time   · Unwillingness or inability to communicate  · Depression  · Unusual sadness, discouragement and loneliness  · Talk of wanting to die  · Neglect of personal appearance   · Rebelliousness- reckless behavior  · Withdrawal from people/activities they love  · Confusion- inability to concentrate     If you or a loved one observes any of these behaviors or has concerns about self-harm, here's what you can do:  · Talk about it- your feelings and reasons for harming yourself  · Remove any means that you might use to  hurt yourself (examples: pills, rope, extension cords, firearm)  · Get professional help from the community (Mental Health, Substance Abuse, psychological counseling)  · Do not be alone:Call your Safe Contact- someone whom you trust who will be there for you.  · Call your local CRISIS HOTLINE 924-6994 or 810-608-8618  · Call your local Children's Mobile Crisis Response Team [place] (943) 849-8450 or www.[employer]  · Call the toll free National Suicide Prevention Hotlines   · National Suicide Prevention Lifeline 600-683-ZHHI (2459)  · National Hope Line Network 800-SUICIDE (295-4230)

## 2019-12-26 NOTE — ANESTHESIA TIME REPORT
Anesthesia Start and Stop Event Times     Date Time Event    12/26/2019 0717 Ready for Procedure     0733 Anesthesia Start     0926 Anesthesia Stop        Responsible Staff  12/26/19    Name Role Begin End    Yadira Rodrigues M.D. Anesth 0733 0926        Preop Diagnosis (Free Text):  Pre-op Diagnosis     MALIGNANT NEOPLASM OF OVERLAPPING SITES OF RIGHT BREAST, DEFORMITY OF RECONSTRUCTED BREASTS, PERSONAL HISTORY OF MALIGNANT NEOPLASM OF BREAST        Preop Diagnosis (Codes):    Post op Diagnosis  History of right breast cancer      Premium Reason  Non-Premium    Comments:

## 2019-12-26 NOTE — ANESTHESIA PROCEDURE NOTES
Peripheral IV  Date/Time: 12/26/2019 7:41 AM  Performed by: Yadira Rodrigues M.D.  Authorized by: Yadira Rodrigues M.D.     Size:  20 G  Laterality:  Left  Local Anesthetic:  None  Site Prep:  Alcohol  Technique:  Direct puncture  Attempts:  1   Right PIV hand in situ discontinued due to only half catheter exposed

## 2019-12-26 NOTE — OR SURGEON
Immediate Post OP Note    PreOp Diagnosis: history of right breast cancer    PostOp Diagnosis: same    Procedure(s):  RECONSTRUCTION, NIPPLE - AREOLAR  APPLICATION, GRAFT, SKIN, FULL-THICKNESS - W/DERMAL GRAFTS  RECONSTRUCTION, BREAST - REVISION W/FAT GRAFTING    Surgeon(s):  Leonard Alva M.D.    Anesthesiologist/Type of Anesthesia:  Anesthesiologist: Yadira Rodrigues M.D./* No anesthesia type entered *    Surgical Staff:  Circulator: Amy Albrecht R.N.  Scrub Person: Aliya Storm    Specimens removed if any:  * No specimens in log *    Estimated Blood Loss: minimal    Findings: see operative note    Complications: no apparent    #[id number]        12/26/2019 7:33 AM Leonard Alva M.D.

## 2019-12-26 NOTE — ANESTHESIA PREPROCEDURE EVALUATION
"45 yo w/hx right sided breast cancer    Relevant Problems   ANESTHESIA   (+) PONV (postoperative nausea and vomiting)      GI   (+) Gastroesophageal reflux disease      Other   (+) Obesity (BMI 30-39.9)     Occasional \"skipped\" heart beats- no associated dizziness/CP.  Work up in progress    Physical Exam    Airway   Mallampati: II  TM distance: >3 FB  Neck ROM: full       Cardiovascular   Rhythm: regular  Rate: normal  (-) murmur     Dental - normal exam         Pulmonary   Breath sounds clear to auscultation     Abdominal    Neurological - normal exam                 Anesthesia Plan    ASA 2       Plan - general       Airway plan will be LMA        Induction: intravenous    Postoperative Plan: Postoperative administration of opioids is intended.    Pertinent diagnostic labs and testing reviewed    Informed Consent:    Anesthetic plan and risks discussed with patient.        "

## 2019-12-26 NOTE — ANESTHESIA PROCEDURE NOTES
Airway  Date/Time: 12/26/2019 7:39 AM  Performed by: Yadira Rodrigues M.D.  Authorized by: Yadira Rodrigues M.D.     Location:  OR  Urgency:  Elective  Indications for Airway Management:  Anesthesia  Spontaneous Ventilation: absent    Sedation Level:  Deep  Preoxygenated: Yes    Mask Difficulty Assessment:  0 - not attempted  Final Airway Type:  Supraglottic airway  Final Supraglottic Airway:  Standard LMA  SGA Size:  4  Number of Attempts at Approach:  1

## 2019-12-26 NOTE — OR NURSING
0924 Patient arrived from OR on [facility]. Report received from RN and Anesthesia. Patient's VS WNL, patient arousable, stable, breathing even/non labored.   0934 pt c/o 4/10 pain, medicated.   0944 Pt states pain increased, re-medicated. Pt not ready for oral pain meds yet.   1006 Pt c/o nausea, medicated with zofran.   1032  at bedside.   1200 Pt meets criteria for phase 2.   1240 Pt to BR, voided. Up on recliner chair.   1245 Pt c/o increased pain, refusing oral pain meds, fentanyl IV given.   1300 Pt sleepy, o2 sat went down , back on 2L NC, pt states she wants to rest for now.   1315 Report to DAVID Ashton for lunch coverage.   1357 Pt to BR, voided.   1407 Patient verbalized readiness to go home. Discharge instructions discussed with patient and , copy given. Patient verbalized understanding to instructions.  Belongings with patient.   1415 Discharge criteria met. PIV out, Discharged via wheelchair.

## 2019-12-26 NOTE — ANESTHESIA POSTPROCEDURE EVALUATION
Patient: Brenna Zhong    Procedure Summary     Date:  12/26/19 Room / Location:  [facility] ROOM 28 / SURGERY SAME DAY [facility]    Anesthesia Start:  0733 Anesthesia Stop:  0926    Procedures:       RECONSTRUCTION, NIPPLE - AREOLAR (Bilateral Breast)      APPLICATION, GRAFT, SKIN, FULL-THICKNESS - W/DERMAL GRAFTS (Bilateral Breast)      RECONSTRUCTION, BREAST - REVISION W/FAT GRAFTING (Bilateral Breast) Diagnosis:  (MALIGNANT NEOPLASM OF OVERLAPPING SITES OF RIGHT BREAST, DEFORMITY OF RECONSTRUCTED BREASTS, PERSONAL HISTORY OF MALIGNANT NEOPLASM OF BREAST)    Surgeon:  Leonard Alva M.D. Responsible Provider:  Yadira Rodrigues M.D.    Anesthesia Type:  general ASA Status:  2          Final Anesthesia Type: general  Last vitals  BP   Blood Pressure: 106/60    Temp   36.1 °C (97 °F)    Pulse   Pulse: (!) 53   Resp   16    SpO2   100 %      Anesthesia Post Evaluation    Patient location during evaluation: PACU  Patient participation: complete - patient participated  Level of consciousness: awake  Pain score: 5    Airway patency: patent  Anesthetic complications: no  Cardiovascular status: adequate  Respiratory status: acceptable  Hydration status: acceptable    PONV: none           Nurse Pain Score: 7 (NPRS)

## 2019-12-26 NOTE — OP REPORT
DATE OF SERVICE:  12/26/2019    PREOPERATIVE DIAGNOSES:  1.  History of right breast cancer.  2.  Asymmetry in reconstructed breasts.    POSTOPERATIVE DIAGNOSES:  1.  History of right breast cancer.  2.  Asymmetry in reconstructed breasts.    PROCEDURES:  1.  Bilateral nipple-areolar reconstruction using a modified skate flap   technique and a full-thickness skin graft.  2.  Bilateral dermal grafts to increase nipple projection.  3.  Bilateral revision of reconstructed breasts with laterally based   dermoglandular flaps.  4.  Bilateral breast reconstruction with fat grafting.    ATTENDING SURGEON:  Leonard Alva MD    ASSISTANT:  Carly Ventura, CST FA    ANESTHESIOLOGIST:  Yadira Rodrigues MD    ANESTHESIA TYPE:  General.    SPECIMENS:  None.    ESTIMATED BLOOD LOSS:  Minimal.    COMPLICATIONS:  No apparent.    INDICATIONS FOR PROCEDURE:  The patient is a 44-year-old woman who underwent   bilateral mastectomies and had immediate reconstruction with placement of   tissue expanders and then had eventual implants placed.  The patient feels   like her right breast has more upper pole fullness.  She also has contour   irregularities along the lateral aspect of the reconstructed breast that she   would like improved.  We did discuss doing these revisions along with nipple   reconstruction.  The patient now presents for the above operation.    INTRAOPERATIVE FINDINGS:  There was about 180 mL of fat harvested and there   was about 150 mL of fat placed into the left reconstructed breast and 30 mL of   fat into the right reconstructed breast.    DESCRIPTION OF PROCEDURE:  After the operative and nonoperative options were   discussed including the risks, benefits and alternatives, which included but   was not limited to bleeding, infection, damage to surrounding structures, need   for further surgery, reaction to anesthetic agent, scarring, breast   asymmetry, contour irregularities, wound healing difficulties,  need for   revisional surgery, implant failure, implant rupture, skin graft failure,   reconstructed nipple failure, wound healing difficulties, deep venous   thrombosis, pulmonary embolus, myocardial infarction, stroke, unsatisfactory   result and/or death, informed consent was obtained.    The patient was identified.  In a sitting upright position, the patient's   sternal notch, midline and inframammary folds were marked.  The planned areas   for laterally based dermoglandular flaps were marked out bilaterally.  The   patient did have more lateral migration on the left hand side and had more   fullness and so a larger advancement flap was drawn out on this side.  The   location of the reconstructed nipples were marked out on the most projecting   portion of the breast mound.  A 38 mm areolar sizer was used to dwain this area   out.  Areas for fat graft harvest were marked in supra and infraumbilical   abdomen and flank regions.  Antibiotics given, sequential compression devices   placed.  The patient was brought back to operating room where general   anesthesia was induced.  Her chest, abdomen, and flanks were prepped and   draped in usual sterile fashion.  Starting on the right hand side, a 38 mm   areolar sizer was used.  The modified skate flap was drawn out.  The superior   and inferior limbs were then deepithelialized.  The flaps were then elevated   from the lateral borders centrally and then rotated on themselves.    Interrupted and running 5-0 Monocryl sutures were then used to secure this.  I   then harvested a small dermal graft from the lateral aspect of the breast.    Tissue was deepithelialized with a 15 blade.  A small piece of reticular   dermis was then harvested.  This was then placed in the dermal graft to   increase nipple projection and secured with interrupted 5-0 fast-absorbing   suture in horizontal mattress fashion.  I then went to the lateral aspect of   the breast and a full-thickness skin  graft was then harvested at the level of   the reticular dermis.  A 38 mm areolar sizer was used for this.  An opening   was then made in the central aspect of the skin graft.  This was then placed   on top of the breast mound and secured with interrupted 4-0 silk sutures on   the periphery and then running 5-0 fast-absorbing suture around the base and   on the periphery.  Then, Xeroform gauze and a bolster dressing was then   formed.    On the same side, the patient had a contour irregularity out laterally.  The   dermoglandular flaps that were marked out preoperatively were then incised   with a 10 blade.  Bovie electrocautery was used to elevate and mobilize and   get rid of the tissue redundancy.  This tissue was then discarded.  Then, the   flaps were then elevated off the breast envelope both in the superior and   inferior direction to create advancement flaps.  The standing cutaneous   deformities were excised medially and laterally.  The deep tissue was then   closed with 2-0 Vicryl sutures, the dermis with 3-0 deep dermal Monocryl   sutures, and a running 4-0 Monocryl subcuticular stitch was used to close the   overlying skin.    We then turned our attention to the left side where the same procedure was   performed.  The same nipple reconstruction was done.  The dermoglandular flap   that was used on the left hand side was much larger to try to help medialize   the breast pocket.  This was then all closed in similar fashion to the right   side.    Poke incisions were made in the lower lateral abdomen and the flank region.    Tumescent solution was then placed in all locations.  Adequate time was   allowed for hemostatic effects of epinephrine to take effect.  Fat was then   harvested using [employer] fat harvesting system from these locations.  The fat   was then irrigated 3 times per protocol.  The fat was then loaded into 30 mL   syringes.  Poke incisions were made medially and superiorly on the left  breast   and medially on the right breast.  Fat was then sequentially grafted using a   fanning technique.  Significantly, about 150 mL of fat was placed in the left   breast and about 30 mL in the right breast.  The fat was then further molded   into position.  The poke incisions both on the breast and abdomen were then   closed with interrupted 5-0 fast-absorbing sutures.  The patient was then   washed, Steri-Strips and compressive dressing was then placed.  She was then   awakened, extubated, and transferred to the PACU in stable condition.  At the   end of the procedure, all sponge, instrument, and needle counts were correct.       ____________________________________     MD KELLIE BUSH / CASSANDRA    DD:  12/26/2019 09:21:32  DT:  12/26/2019 10:11:52    D#:  [id number]  Job#:  [id number]

## 2019-12-26 NOTE — OR NURSING
1315-Assumed temporary care of patient. Bedside report received from DAVID Armenta. Pt sitting up in recliner resting comfortably.  at bedside. 02 removed for trial run.    1330-denies needs, resting quietly in chair.  at bedside.    1350-hand off to DAVID Armenta.

## (undated) DEVICE — SODIUM CHL IRRIGATION 0.9% 1000ML (12EA/CA)

## (undated) DEVICE — SUTURE 4-0 SILK SH C/R 8 X 18 (12EA/BX)"

## (undated) DEVICE — MANIFOLD NEPTUNE 1 PORT (20/PK)

## (undated) DEVICE — NEEDLE SAFETY 18 GA X 1 1/2 IN (100EA/BX)

## (undated) DEVICE — LACTATED RINGERS INJ 1000 ML - (14EA/CA 60CA/PF)

## (undated) DEVICE — PLUMEPEN ULTRA 3/8 IN X 10 FT HOSE (20EA/CA)

## (undated) DEVICE — GLOVE BIOGEL INDICATOR SZ 8.5 SURGICAL PF LTX - (50/BX 4BX/CA)

## (undated) DEVICE — SENSOR SPO2 NEO LNCS ADHESIVE (20/BX) SEE USER NOTES

## (undated) DEVICE — TUBE CONNECTING SUCTION - CLEAR PLASTIC STERILE 72 IN (50EA/CA)

## (undated) DEVICE — PROTECTOR ULNA NERVE - (36PR/CA)

## (undated) DEVICE — CLOSURE SKIN STRIP 1/2 X 4 IN - (STERI STRIP) (50/BX 4BX/CA)

## (undated) DEVICE — SUTURE GENERAL

## (undated) DEVICE — GRAFTING FAT REVOLVE (1EA/PK)

## (undated) DEVICE — CHLORAPREP 26 ML APPLICATOR - ORANGE TINT(25/CA)

## (undated) DEVICE — SET LEADWIRE 5 LEAD BEDSIDE DISPOSABLE ECG (1SET OF 5/EA)

## (undated) DEVICE — PACK SINGLE BASIN - (6/CA)

## (undated) DEVICE — ELECTRODE DUAL RETURN W/ CORD - (50/PK)

## (undated) DEVICE — TUBING PAL ASPIRATION LIPOSUC 12FT (5EA/PK)

## (undated) DEVICE — SPANDAGE CHEST SZ10 25YDS - STRETCH (21 EA/CA 1RL/CA)

## (undated) DEVICE — GOWN WARMING STANDARD FLEX - (30/CA)

## (undated) DEVICE — CANISTER SUCTION RIGID RED 1500CC (40EA/CA)

## (undated) DEVICE — SYRINGE 50ML CATHETER TIP (40EA/BX 4BX/CA)

## (undated) DEVICE — SUTURE 3-0 SILK SH 30 (36PK/BX)

## (undated) DEVICE — SUTURE 5-0 PLAIN GUT PC-1 - (12/BX)

## (undated) DEVICE — SPONGE XRAY 8X4 STERL. 12PL - (10EA/TY 80TY/CA)

## (undated) DEVICE — HEAD HOLDER JUNIOR/ADULT

## (undated) DEVICE — SUTURE 3-0 MONOCRYL PLUS PS-2 - (12/BX)

## (undated) DEVICE — CATHETER IV 20 GA X 1-1/4 ---SURG.& SDS ONLY--- (50EA/BX)

## (undated) DEVICE — CANISTER SUCTION 3000ML MECHANICAL FILTER AUTO SHUTOFF MEDI-VAC NONSTERILE LF DISP  (40EA/CA)

## (undated) DEVICE — SUTURE 4-0 MONOCRYL PLUS PS-2 - 27 INCH (36/BX)

## (undated) DEVICE — PACK BREAST RECONSTRUCTION (4/CA)

## (undated) DEVICE — KIT  I.V. START (100EA/CA)

## (undated) DEVICE — KIT ANESTHESIA W/CIRCUIT & 3/LT BAG W/FILTER (20EA/CA)

## (undated) DEVICE — DRESSING PETROLEUM GAUZE 5 X 9" (50EA/BX 4BX/CA)"

## (undated) DEVICE — TUBING TUMESENCE - 10/BX

## (undated) DEVICE — ELECTRODE 850 FOAM ADHESIVE - HYDROGEL RADIOTRNSPRNT (50/PK)

## (undated) DEVICE — TUBING CLEARLINK DUO-VENT - C-FLO (48EA/CA)

## (undated) DEVICE — SUTURE 2-0 VICRYL PLUS SH - 8 X 18 INCH (12/BX)

## (undated) DEVICE — MASK, LARYNGEAL AIRWAY #4

## (undated) DEVICE — GAUZE FLUFF STERILE 2-PLY 36 X 36 (100EA/CA)

## (undated) DEVICE — SYRINGE SAFETY 10 ML 18 GA X 1 1/2 BLUNT LL (100/BX 4BX/CA)

## (undated) DEVICE — GLOVE BIOGEL SZ 8 SURGICAL PF LTX - (50PR/BX 4BX/CA)

## (undated) DEVICE — STAPLER SKIN DISP - (6/BX 10BX/CA) VISISTAT

## (undated) DEVICE — GLOVE SZ 6.5 BIOGEL PI MICRO - PF LF (50PR/BX)

## (undated) DEVICE — MAT PATIENT POSITIONING PREVALON (10EA/CA)

## (undated) DEVICE — SUCTION INSTRUMENT YANKAUER BULBOUS TIP W/O VENT (50EA/CA)

## (undated) DEVICE — MASK ANESTHESIA ADULT  - (100/CA)

## (undated) DEVICE — SPONGE GAUZESTER 4 X 4 4PLY - (128PK/CA)

## (undated) DEVICE — MASK AIRWAY SIZE 3 UNIQUE SILICON (10/BX)

## (undated) DEVICE — GLOVE BIOGEL SZ 7.5 SURGICAL PF LTX - (50PR/BX 4BX/CA)

## (undated) DEVICE — SET EXTENSION WITH 2 PORTS (48EA/CA) ***PART #2C8610 IS A SUBSTITUTE*****